# Patient Record
Sex: FEMALE | Race: WHITE | Employment: OTHER | URBAN - METROPOLITAN AREA
[De-identification: names, ages, dates, MRNs, and addresses within clinical notes are randomized per-mention and may not be internally consistent; named-entity substitution may affect disease eponyms.]

---

## 2018-05-17 PROBLEM — R42 DIZZINESS AND GIDDINESS: Status: ACTIVE | Noted: 2018-05-17

## 2018-05-17 PROBLEM — R39.11 URINARY HESITANCY: Status: ACTIVE | Noted: 2018-05-17

## 2018-05-17 PROBLEM — R35.0 URINARY FREQUENCY: Status: ACTIVE | Noted: 2018-05-17

## 2018-05-17 PROBLEM — B37.3 VAGINAL CANDIDIASIS: Status: ACTIVE | Noted: 2018-05-17

## 2018-05-17 PROBLEM — F02.80 EARLY ONSET ALZHEIMER'S DEMENTIA WITHOUT BEHAVIORAL DISTURBANCE (HCC): Status: ACTIVE | Noted: 2018-05-17

## 2018-05-17 PROBLEM — N95.0 POST-MENOPAUSAL BLEEDING: Status: ACTIVE | Noted: 2018-05-17

## 2018-05-17 PROBLEM — R26.81 GAIT INSTABILITY: Status: ACTIVE | Noted: 2018-05-17

## 2018-05-17 PROBLEM — G30.0 EARLY ONSET ALZHEIMER'S DEMENTIA WITHOUT BEHAVIORAL DISTURBANCE (HCC): Status: ACTIVE | Noted: 2018-05-17

## 2018-05-17 PROCEDURE — 81003 URINALYSIS AUTO W/O SCOPE: CPT | Performed by: INTERNAL MEDICINE

## 2018-05-17 PROCEDURE — 87086 URINE CULTURE/COLONY COUNT: CPT | Performed by: INTERNAL MEDICINE

## 2019-08-02 PROBLEM — R39.11 URINARY HESITANCY: Status: RESOLVED | Noted: 2018-05-17 | Resolved: 2019-08-02

## 2019-08-02 PROBLEM — R42 DIZZINESS AND GIDDINESS: Status: RESOLVED | Noted: 2018-05-17 | Resolved: 2019-08-02

## 2019-08-02 PROBLEM — F17.210 CIGARETTE NICOTINE DEPENDENCE WITHOUT COMPLICATION: Status: ACTIVE | Noted: 2019-08-02

## 2019-08-02 PROBLEM — M19.011 LOCALIZED OSTEOARTHRITIS OF BOTH SHOULDER REGIONS: Status: ACTIVE | Noted: 2019-08-02

## 2019-08-02 PROBLEM — R26.81 GAIT INSTABILITY: Status: RESOLVED | Noted: 2018-05-17 | Resolved: 2019-08-02

## 2019-08-02 PROBLEM — Z12.31 VISIT FOR SCREENING MAMMOGRAM: Status: ACTIVE | Noted: 2019-08-02

## 2019-08-02 PROBLEM — R35.0 URINARY FREQUENCY: Status: RESOLVED | Noted: 2018-05-17 | Resolved: 2019-08-02

## 2019-08-02 PROBLEM — B37.3 VAGINAL CANDIDIASIS: Status: RESOLVED | Noted: 2018-05-17 | Resolved: 2019-08-02

## 2019-08-02 PROBLEM — N95.0 POST-MENOPAUSAL BLEEDING: Status: RESOLVED | Noted: 2018-05-17 | Resolved: 2019-08-02

## 2019-08-02 PROBLEM — Z98.49: Status: ACTIVE | Noted: 2019-08-02

## 2019-08-02 PROBLEM — M19.012 LOCALIZED OSTEOARTHRITIS OF BOTH SHOULDER REGIONS: Status: ACTIVE | Noted: 2019-08-02

## 2019-08-02 PROBLEM — Z78.0 MENOPAUSE: Status: ACTIVE | Noted: 2019-08-02

## 2020-04-04 ENCOUNTER — HOSPITAL ENCOUNTER (EMERGENCY)
Age: 75
Discharge: HOME | End: 2020-04-04
Attending: EMERGENCY MEDICINE
Payer: MEDICARE

## 2020-04-04 ENCOUNTER — APPOINTMENT (OUTPATIENT)
Dept: GENERAL RADIOLOGY | Age: 75
End: 2020-04-04
Attending: EMERGENCY MEDICINE
Payer: MEDICARE

## 2020-04-04 VITALS
OXYGEN SATURATION: 96 % | HEIGHT: 61 IN | HEART RATE: 65 BPM | SYSTOLIC BLOOD PRESSURE: 149 MMHG | TEMPERATURE: 98 F | BODY MASS INDEX: 22.66 KG/M2 | RESPIRATION RATE: 18 BRPM | WEIGHT: 120 LBS | DIASTOLIC BLOOD PRESSURE: 53 MMHG

## 2020-04-04 DIAGNOSIS — J44.1 COPD EXACERBATION (HCC): Primary | ICD-10-CM

## 2020-04-04 PROCEDURE — 99283 EMERGENCY DEPT VISIT LOW MDM: CPT

## 2020-04-04 PROCEDURE — 71045 X-RAY EXAM CHEST 1 VIEW: CPT | Performed by: EMERGENCY MEDICINE

## 2020-04-04 RX ORDER — ALBUTEROL SULFATE 90 UG/1
2 AEROSOL, METERED RESPIRATORY (INHALATION) EVERY 4 HOURS PRN
Qty: 1 INHALER | Refills: 0 | Status: SHIPPED | OUTPATIENT
Start: 2020-04-04 | End: 2020-05-04

## 2020-04-04 RX ORDER — PREDNISONE 20 MG/1
60 TABLET ORAL DAILY
Qty: 15 TABLET | Refills: 0 | Status: SHIPPED | OUTPATIENT
Start: 2020-04-04 | End: 2020-07-01 | Stop reason: ALTCHOICE

## 2020-04-04 RX ORDER — AZITHROMYCIN 250 MG/1
TABLET, FILM COATED ORAL
Qty: 1 PACKAGE | Refills: 0 | Status: SHIPPED | OUTPATIENT
Start: 2020-04-04 | End: 2020-04-09

## 2020-04-04 NOTE — ED INITIAL ASSESSMENT (HPI)
WAS DX WITH PNEUMONIA ABOUT 10 DAYS AGO AND FINISHED ABX TODAY. STARTED COUGH 2 WEEKS AGO.   REPORTS YESTERDAY WITH SOB

## 2020-04-04 NOTE — ED PROVIDER NOTES
Patient Seen in: THE [facility] In [place]      History   Patient presents with:  Cough/URI    Stated Complaint: cough, hard to catch my breath, no fever    HPI    She is a pleasant 45-year-old woman who presents with cough and congestion meningismus. No adenopathy  Lungs: No tachypnea. Distant. No bertram wheezing. No conversational dyspnea. No dyspnea with activity here. Cardiac: No tachycardia. No murmurs. Regular rate and rhythm. Abdomen: Soft and nontender throughout.   No reboun - Potential duplicate medications found. Please discuss with provider.

## 2020-07-01 PROBLEM — Z12.11 ENCOUNTER FOR SCREENING FECAL OCCULT BLOOD TESTING: Status: ACTIVE | Noted: 2020-07-01

## 2020-09-25 PROBLEM — J44.9 COPD (CHRONIC OBSTRUCTIVE PULMONARY DISEASE) (HCC): Status: ACTIVE | Noted: 2020-09-25

## 2020-10-02 PROBLEM — R01.1 MURMUR, CARDIAC: Status: ACTIVE | Noted: 2020-10-02

## 2020-10-02 PROBLEM — H91.92 HEARING DIFFICULTY OF LEFT EAR: Status: ACTIVE | Noted: 2020-10-02

## 2020-10-02 PROBLEM — F09 MILD COGNITIVE DISORDER: Status: ACTIVE | Noted: 2020-10-02

## 2020-10-02 PROBLEM — N39.46 MIXED STRESS AND URGE URINARY INCONTINENCE: Status: ACTIVE | Noted: 2020-10-02

## 2021-04-14 PROBLEM — M79.641 PAIN OF RIGHT HAND: Status: ACTIVE | Noted: 2021-04-14

## 2021-04-14 PROBLEM — K21.9 GASTROESOPHAGEAL REFLUX DISEASE WITHOUT ESOPHAGITIS: Status: ACTIVE | Noted: 2021-04-14

## 2021-04-14 PROBLEM — M54.31 SCIATICA OF RIGHT SIDE: Status: ACTIVE | Noted: 2021-04-14

## 2021-04-28 PROBLEM — K52.831 COLITIS, COLLAGENOUS: Status: ACTIVE | Noted: 2021-04-28

## 2021-08-17 PROBLEM — Z00.00 ANNUAL PHYSICAL EXAM: Status: ACTIVE | Noted: 2021-08-17

## 2021-08-17 PROBLEM — I36.1 NONRHEUMATIC TRICUSPID VALVE REGURGITATION: Status: ACTIVE | Noted: 2021-08-17

## 2021-08-17 PROBLEM — S46.012D TRAUMATIC COMPLETE TEAR OF LEFT ROTATOR CUFF, SUBSEQUENT ENCOUNTER: Status: ACTIVE | Noted: 2021-08-17

## 2021-12-17 PROBLEM — R73.03 PREDIABETES: Status: ACTIVE | Noted: 2021-12-17

## 2021-12-17 PROBLEM — R03.0 ELEVATED BLOOD PRESSURE READING: Status: ACTIVE | Noted: 2021-12-17

## 2021-12-17 PROBLEM — Z11.9 SCREENING EXAMINATION FOR INFECTIOUS DISEASE: Status: ACTIVE | Noted: 2021-12-17

## 2022-01-28 PROBLEM — R03.0 ELEVATED BLOOD PRESSURE READING: Status: RESOLVED | Noted: 2021-12-17 | Resolved: 2022-01-28

## 2022-01-28 PROBLEM — M46.96 UNSPECIFIED INFLAMMATORY SPONDYLOPATHY, LUMBAR REGION (HCC): Status: RESOLVED | Noted: 2022-01-28 | Resolved: 2022-01-28

## 2022-01-28 PROBLEM — F09 MILD COGNITIVE DISORDER: Status: RESOLVED | Noted: 2020-10-02 | Resolved: 2022-01-28

## 2022-01-28 PROBLEM — M46.96 UNSPECIFIED INFLAMMATORY SPONDYLOPATHY, LUMBAR REGION (HCC): Status: ACTIVE | Noted: 2022-01-28
